# Patient Record
Sex: FEMALE
[De-identification: names, ages, dates, MRNs, and addresses within clinical notes are randomized per-mention and may not be internally consistent; named-entity substitution may affect disease eponyms.]

---

## 2024-09-17 PROBLEM — Z00.00 ENCOUNTER FOR PREVENTIVE HEALTH EXAMINATION: Status: ACTIVE | Noted: 2024-09-17

## 2024-09-22 ENCOUNTER — NON-APPOINTMENT (OUTPATIENT)
Age: 89
End: 2024-09-22

## 2024-09-23 ENCOUNTER — APPOINTMENT (OUTPATIENT)
Dept: NEUROLOGY | Facility: CLINIC | Age: 89
End: 2024-09-23
Payer: MEDICARE

## 2024-09-23 DIAGNOSIS — R26.89 OTHER ABNORMALITIES OF GAIT AND MOBILITY: ICD-10-CM

## 2024-09-23 DIAGNOSIS — Z78.9 OTHER SPECIFIED HEALTH STATUS: ICD-10-CM

## 2024-09-23 DIAGNOSIS — R51.9 HEADACHE, UNSPECIFIED: ICD-10-CM

## 2024-09-23 DIAGNOSIS — Z86.39 PERSONAL HISTORY OF OTHER ENDOCRINE, NUTRITIONAL AND METABOLIC DISEASE: ICD-10-CM

## 2024-09-23 DIAGNOSIS — E03.9 HYPOTHYROIDISM, UNSPECIFIED: ICD-10-CM

## 2024-09-23 PROCEDURE — G2211 COMPLEX E/M VISIT ADD ON: CPT

## 2024-09-23 PROCEDURE — 99205 OFFICE O/P NEW HI 60 MIN: CPT

## 2024-09-23 RX ORDER — AMITRIPTYLINE HYDROCHLORIDE 10 MG/1
10 TABLET, FILM COATED ORAL
Qty: 30 | Refills: 2 | Status: ACTIVE | COMMUNITY
Start: 2024-09-23 | End: 1900-01-01

## 2024-09-23 RX ORDER — INSULIN ASPART 100 [IU]/ML
100 INJECTION, SOLUTION INTRAVENOUS; SUBCUTANEOUS
Refills: 0 | Status: ACTIVE | COMMUNITY

## 2024-09-23 RX ORDER — LEVOTHYROXINE SODIUM 75 UG/1
75 TABLET ORAL
Refills: 0 | Status: ACTIVE | COMMUNITY

## 2024-09-23 NOTE — HISTORY OF PRESENT ILLNESS
[FreeTextEntry1] : 89 yr old female with longstanding diabetes presents for chronic history of daily headaches and episodic attacks of severe vertigo . Patients  and daughter were also at this visit.   Her last disabling vertigo that happened over year ago . Usually treated with Ondansetron, meclizine, or even steroids . She has tried acetazolamide in the past but it caused dizziness.   She is also troubled by daily headaches daily since approximately 2021. Denies migraine or headaches in her younger years. Pain is located on right frontal temporal region  Nature is throbbing in nature  and estimates it is 5-6/10 in intensity every day Mitigating factors - sleep can help mitigate severity Aggravating factors - unaware of any but states pain is there constantly Takes acetaminophen before bed because she cannot fall asleep but when she gets up to go to the bathroom the pain is still there  In the past was using overuse of NSAID and developed gastritis  Abortive treatments - unsure but thinks steroids prescribed for vertigo may have helped but it affected her glycemic control Preventive agents : ondansetron used for nausea ; Steroids tried when in the hospital for acute vertigo but it adversely affected her blood sugar. It seemed to help the vertigo  Lifestyle  Sleeps at 9-10 pm each night gets up 4-5 times per night urge to go to the bathroom wake up at 8-8:30 in the morning Fluid intake : only 5 ounces of water per day as she golfs every day and is trying to limit urge to go to the bathroom Caffeine : drink 2 cups coffee per day Active plays golf every day Eats meals at regular intervals and follows a very health low carb diet  Specialist she has seen in the past  Dr. Ted Torres - he ordered a high resolution MRI brain to look for any vascular compression of 8th nerve but none found  Dr. Sherron Pendleton [facility] Neuorlogist  Dr. Rhonda Jon Neurootologist  Dr. Omar Urban Vestibular testing twice noted  no abnormality twice MRIbrain

## 2024-09-23 NOTE — ASSESSMENT
[FreeTextEntry1] : 89 yr old female with longstanding DM presents with chronic daily left frontotemporal headache and intermittent attacks of severe vertigo  She has only tried abortive treatment when she has an attack of vertigo She has never tried any headache preventive agents We discussed concern about poor hydration and poor sleep quality which may be aggravating features of her headache She has impaired balance on examination and prior MRI showed evidence for chronic ischemic changes in 2021  Recommend MRI brain C+/C- and MRV to assure no increase in overall ischemic burden and exclude possibility of cerebral vein thrombosis as she hydrates very poorly and plays golf daily Labs requests: ESR, CRP, b12, MMA, homocysteine,  Discussed starting low dose amitriptyline for headache prevention 10 mg per day  The gool is to improve sleep quality and reduce interruptions in sleep Suggested she follow up with urology as well On amitriptyline monitor for blurry vision, increased dizziness, urinary retention/hesitancy Encoraged her to increase daily water intake but restrict fluid intake after 6 pm to reduce urinary urgency at night Requested she keep a headache diary as well to monitor effects of lifestyle and medication effects on daily headache  F/u with ACP in 6 weeks

## 2024-09-23 NOTE — DATA REVIEWED
[de-identified] : 6/14/2021 MRI brain C+/C- mild to moderate small white matter ischemic disease; no acute stroke; Punctate foci of gradient echo signal gradient in right basal ganglia and left frontal semiovale. No mass , no hydrocephalus, no enhancing lesion  [de-identified] : 10/22/2021 CRP < 5, HbAIC 6.9, CMP WNL, Vit B12 > 1000, CBC WNL

## 2024-09-23 NOTE — PHYSICAL EXAM
[FreeTextEntry1] : Mental status: Orientation: Alert , oriented to month, day of week, year, location                                                                                                                                    Speech is fluent , able to name objects, repeat a sentence and write a sentence                                                                                                                                                                                                                                         Comprehension : able follow 3 step requests                                                                                                                                                                                                                            Apraxia and visuospatial able to draw clock drawing and intersecting pentagon                                                                                                          Neglect is absent                                                                                                                                                                                                                                                                                  Agnosia is absent                                                                                                                                          Cranial nerves: CN I deferred. CN  II VFF; III, IV, VI: PERRLA, EOM with mild nystagmus seen on left gaze IV: Facial sensation normal B/L to touch, pinprick and temperatureVII:Facial strength normal B/L. VIII: Gross hearing intact IX, X: palate midline and elevates symmetrically XI: Trapezius normal strength, XII: Tongue midline without atrophy or fasciculation Motor: Muscle tone no rigidity or resistance in all 4 extremities. No atrophy or fasciculation. Muscle strength: arms and legs, proximal and distal flexors and extensors are normal 5/5 . No UE drift. Sensory: reduced light touch, temperature , vibration and proproception in feet  Reflexes: diminished 1+ all ext  Coordination: finger to nose: normal. Heel to shin: normal Gait:  slight wide based mildy unsteady; + Romberg

## 2024-09-24 LAB
CRP SERPL-MCNC: <3 MG/L
ERYTHROCYTE [SEDIMENTATION RATE] IN BLOOD BY WESTERGREN METHOD: 20 MM/HR
FOLATE SERPL-MCNC: >20 NG/ML
HCYS SERPL-MCNC: 8.9 UMOL/L
VIT B12 SERPL-MCNC: >2000 PG/ML

## 2024-09-30 LAB — METHYLMALONATE SERPL-SCNC: 148 NMOL/L
